# Patient Record
Sex: FEMALE | URBAN - METROPOLITAN AREA
[De-identification: names, ages, dates, MRNs, and addresses within clinical notes are randomized per-mention and may not be internally consistent; named-entity substitution may affect disease eponyms.]

---

## 2020-12-28 ENCOUNTER — APPOINTMENT (OUTPATIENT)
Dept: URBAN - METROPOLITAN AREA SURGERY 8 | Age: 30
Setting detail: DERMATOLOGY
End: 2020-12-28

## 2020-12-28 DIAGNOSIS — L72.8 OTHER FOLLICULAR CYSTS OF THE SKIN AND SUBCUTANEOUS TISSUE: ICD-10-CM

## 2020-12-28 PROCEDURE — OTHER DEFER: OTHER

## 2020-12-28 PROCEDURE — OTHER ADDITIONAL NOTES: OTHER

## 2020-12-28 PROCEDURE — 99202 OFFICE O/P NEW SF 15 MIN: CPT

## 2020-12-28 PROCEDURE — OTHER COUNSELING: OTHER

## 2020-12-28 ASSESSMENT — LOCATION SIMPLE DESCRIPTION DERM: LOCATION SIMPLE: POSTERIOR NECK

## 2020-12-28 ASSESSMENT — LOCATION ZONE DERM: LOCATION ZONE: NECK

## 2020-12-28 ASSESSMENT — LOCATION DETAILED DESCRIPTION DERM: LOCATION DETAILED: LEFT LATERAL TRAPEZIAL NECK

## 2020-12-28 NOTE — PROCEDURE: ADDITIONAL NOTES
Detail Level: Zone
Additional Notes: DIscussed benign etiology and that if asymptomatic, no treatment is necessary at this time. Offered excision as it was previously inflamed, but she declined. Instructed to call if becomes symptomatic or enlarges, and can plan for excision

## 2020-12-28 NOTE — HPI: CYST
Is This A New Presentation, Or A Follow-Up?: Cyst
Additional History: Cyst became larger, inflamed, and began draining in later November. Saw Dr. Barrera and given 10 day course of antibiotics with improvement. Reports small opening but denies current pain, drainage, or recent enlargement.